# Patient Record
Sex: FEMALE | Race: WHITE | URBAN - METROPOLITAN AREA
[De-identification: names, ages, dates, MRNs, and addresses within clinical notes are randomized per-mention and may not be internally consistent; named-entity substitution may affect disease eponyms.]

---

## 2017-01-25 ENCOUNTER — TRANSFERRED RECORDS (OUTPATIENT)
Dept: HEALTH INFORMATION MANAGEMENT | Facility: CLINIC | Age: 37
End: 2017-01-25

## 2017-01-25 DIAGNOSIS — O26.90 PREGNANCY RELATED CONDITION, UNSPECIFIED TRIMESTER: Primary | ICD-10-CM

## 2017-01-26 ENCOUNTER — TELEPHONE (OUTPATIENT)
Dept: MATERNAL FETAL MEDICINE | Facility: CLINIC | Age: 37
End: 2017-01-26

## 2017-01-26 DIAGNOSIS — Z33.1 PREGNANCY, INCIDENTAL: ICD-10-CM

## 2017-01-26 DIAGNOSIS — D68.52: Primary | ICD-10-CM

## 2017-01-26 DIAGNOSIS — K51.90 ULCERATIVE COLITIS (H): ICD-10-CM

## 2017-01-26 DIAGNOSIS — O09.523 AMA (ADVANCED MATERNAL AGE) MULTIGRAVIDA 35+, THIRD TRIMESTER: ICD-10-CM

## 2017-01-26 NOTE — TELEPHONE ENCOUNTER
STEVE Rodriguez at [facility] regarding MFM consult request. P[facility] call back number left. Need to request records from [place] (LM with patient yesterday). Patient is currently 38+weeks gestation.

## 2017-01-27 ENCOUNTER — PRE VISIT (OUTPATIENT)
Dept: MATERNAL FETAL MEDICINE | Facility: CLINIC | Age: 37
End: 2017-01-27

## 2017-01-31 ENCOUNTER — OFFICE VISIT (OUTPATIENT)
Dept: MATERNAL FETAL MEDICINE | Facility: CLINIC | Age: 37
End: 2017-01-31
Attending: OBSTETRICS & GYNECOLOGY
Payer: COMMERCIAL

## 2017-01-31 DIAGNOSIS — D68.52: ICD-10-CM

## 2017-01-31 DIAGNOSIS — K51.90 ULCERATIVE COLITIS (H): ICD-10-CM

## 2017-01-31 DIAGNOSIS — Z33.1 PREGNANCY, INCIDENTAL: ICD-10-CM

## 2017-01-31 DIAGNOSIS — O09.523 AMA (ADVANCED MATERNAL AGE) MULTIGRAVIDA 35+, THIRD TRIMESTER: ICD-10-CM

## 2017-01-31 PROCEDURE — 96040 ZZH GENETIC COUNSELING, EACH 30 MINUTES: CPT | Mod: ZF | Performed by: GENETIC COUNSELOR, MS

## 2017-01-31 NOTE — PROGRESS NOTES
Patient: Cassie Rodas  YOB: 1980  Date of Service: 17    Cassie Rodas was seen at [facility] for genetic consultation and consultation with the [facility] physician.  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    Impression/Plan:    1.  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  She met with Dr. Mcrae today to discuss implications of this condition for pregnancy management.    2.  We reviewed the normal results from NIPS done earlier in the pregnancy.  Pregnancy History:    /Parity:   Age at Delivery: 36 years old    YOSVANY: 2017, by LMP confirmed by ultrasound  Gestational Age: 38w6d    Cassie used azathioprine for the first 3-4 weeks of pregnancy.  She has also received monthly remicade infusions.  No significant complications or exposures were reported in the current pregnancy.    Family History:    A three-generation pedigree was obtained, and is scanned under the  Media  tab.     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  She reports that she was first tested ten years ago after her sister was found to have this mutation.  Documentation from [facility] was available that confirmed Cassie's genetic testing result.  She has no history of clots or pulmonary embolism.  She has not used OCPs, smoked, or been treated with blood thinners.  She also has ulcerative colitis.    Cassie's sister carries a prothrombin (F2) gene mutation.  It is not known whether she is heterozygous or homozygous.  Cassie does not know why her sister was tested but that it may have occurred during a pregnancy.  Her sister has no history of clots or pulmonary embolism.  She was treated with heparin during pregnancy and coumadin after pregnancy.  She has one daughter  who was born with congenital hip dysplasia and one daughter who is a selective mute.    Cassie's mother has a history of stroke.  She also had clots during both pregnancies.  Genetic testing is not known.  She also has Crohn s disease.    Cassie has no other maternal or paternal history of clots, pulmonary embolism, DVT, stroke, or prothrombin genetic testing.    The father of the baby, Cassie's partner Percy, has no significant family history.    The reported family history is negative for other birth defects, intellectual disability, childhood hearing or vision loss, multiple miscarriage, stillbirth, and consanguinity.    Carrier Screening:    The patient reports that she and the father of the pregnancy have  ancestry:      Cystic fibrosis is an autosomal recessive genetic condition that occurs with increased frequency in individuals of  ancestry and carrier screening for this condition is available.  In addition,  screening in the [facility] includes cystic fibrosis.    Expanded carrier screening for mutations in a large panel of genes associated with autosomal recessive conditions including cystic fibrosis, spinal muscular atrophy, and others, is now available.    The patient reports that she has had normal carrier testing for CF.  She declined the additional carrier screening options reviewed today.    Risk Assessment for Chromosome Conditions:    We explained that the risk for fetal chromosome abnormalities increases with maternal age. We discussed specific features of common chromosome abnormalities, including Down syndrome, trisomy 13, trisomy 18, and sex chromosome trisomies.    We reviewed Cassie's negative (normal) NIPS results from the current pregnancy.  This test result showed that Evelyns pregnancy is at low risk for Trisomy 21, Trisomy 13, Trisomy 18, and sex chromosome aneuploidy.  Her current risk is estimated at < 1 in 10,000.  We reviewed the benefits and  limitations of this testing.  NIPS is a screening test that provides a risk assessment specific to the pregnancy for certain fetal chromosome abnormalities, but cannot definitively diagnose or exclude a fetal chromosome abnormality.     Risk Assessment for prothrombin gene mutation:    We reviewed that Cassie is a heterozygote for a mutation in the prothrombin (F2) gene.  We all carry two copies of the F2 gene, one inherited from mom and one from dad.  Individuals with a genetic change in one copy of the F2 gene are referred to as heterozygotes and have an increased risk for blood clots, although some people never develop blood clots.  We discussed that Cassie has never been a smoker or used oral contraception, meaning that she has avoided two factors that can increase risk of blood clots.  There is currently insufficient information to determine whether Cassie inherited this condition from her mother or her father.  It is not known whether her mother carries the prothrombin mutation or whether mother's history of stroke and blood clots is related to this mutation.  We discussed that Cassie could gather additional information on her parents  health history, and if needed, genetic testing is available for other members of the family.  Cassie understood that there is a 50% chance with every pregnancy of passing down this trait.  We recommended that Cassie share this information with her child s pediatrician.  No prenatal testing is recommended for this condition.    Risk Assessment for ulcerative colitis:    We discussed that ulcerative colitis is a multifactorial trait, meaning that it is caused by a combination of environmental and genetic factors.  Cassie's mother has Crohn s disease, which shares a large number of risk genes with UC.  Family clusters of UC and/or CD are often seen due to shared environmental and shared genetic factors.  Recurrence risk to 1st degree relatives has been found to be ~3%.  We  recommended that Cassie share this information with her child s pediatrician.      It was a pleasure to be involved with Cassie's care. Face-to-face time of the meeting was 30 minutes.    Laya Evans  Genetic counseling intern, scribing for:

## 2017-02-01 NOTE — PROGRESS NOTES
[facility] Consultation    Dear Teofilo,    Thank you for requesting a Maternal-Fetal Medicine Consultation on Cassie Rodas regarding her pregnancy complicated by her heterozygous Prothrombin [id number] mutation. As you know she is a 36 year old  with an intrauterine pregnancy at 38 and 5/7 weeks.  Cassie is a known heterozygote for a Prothrombin gene mutation. She also has a sister that has been diagnosed with the mutation, but is unsure as to whether she is homozygous or heterozygous. Cassie and her sister have not had any history of clots or pulmonary emboli. Cassie s mother has had a history of stroke, and she has had clots during pregnancy. Her mother has not been tested for the Prothrombin mutation. There is a weak association between Prothrombin mutations and Cerebrovascular Infarctions but no data on management with anticoagulants.   In consideration of the patient s familial history and her increased risk for DVT, it is recommended that she be offered prophylactic Lovenox SQ postpartum for 6 weeks. I spent approximately 15 minutes face-to-face with this patient, of which, the majority (>50%) was spent counseling and coordinating the care. We also discussed her ulerative colitis and medication including Remicade (infliximab) which were addressed in the genetic counseling note. There are no absolute contraindications to breastfeeding. (See GC note).    Noe Mcrae MD   Maternal Fetal Medicine Specialist    A copy of this consultation is being sent to your office.

## 2017-02-08 ENCOUNTER — TELEPHONE (OUTPATIENT)
Dept: MATERNAL FETAL MEDICINE | Facility: CLINIC | Age: 37
End: 2017-02-08

## 2017-02-08 NOTE — TELEPHONE ENCOUNTER
Cassie called in with more questions after her consult with Dr. Mcrae.  Cassie wanted to know if Dr. Mcrae was able to get her mother's records from [place] from 1993.  Cassie wanted to know how much Lovenox it was recommended that she take, when to start it after delivery and if she needed this for both C/S and vaginal delivery or just C/S delivery.  Writer spoke with Dr. Louie's regarding these questions.  Dr. Mcrae attempted to get her mother's records, but they were not scanned into Epic as it was such a long time ago.  Dr. Mcrae spoke with hematology who recommended 40mg SC Lovenox daily postpartum for 6 weeks. Pt should start this 6-8 weeks after delivery.  Recommend pt start Lovenox whether she has vaginal or c/s delivery.  This information was communicated with the  pt and she has no further questions or concerns at this time.  Louise Sommer RN